# Patient Record
Sex: FEMALE | Race: WHITE | NOT HISPANIC OR LATINO | Employment: PART TIME | URBAN - NONMETROPOLITAN AREA
[De-identification: names, ages, dates, MRNs, and addresses within clinical notes are randomized per-mention and may not be internally consistent; named-entity substitution may affect disease eponyms.]

---
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
## 2022-01-30 DIAGNOSIS — E55.9 VITAMIN D DEFICIENCY: ICD-10-CM

## 2022-01-31 RX ORDER — ERGOCALCIFEROL 1.25 MG/1
CAPSULE ORAL
Qty: 8 CAPSULE | Refills: 0 | OUTPATIENT
Start: 2022-01-31

## 2022-10-18 ENCOUNTER — TELEPHONE (OUTPATIENT)
Dept: FAMILY MEDICINE CLINIC | Facility: CLINIC | Age: 22
End: 2022-10-18

## 2022-10-18 NOTE — TELEPHONE ENCOUNTER
Attempted to reach patient to reschedule 10/11 appt, patient did not answer, vm did not     HUB okay to reschedule

## 2022-12-01 ENCOUNTER — TELEPHONE (OUTPATIENT)
Dept: FAMILY MEDICINE CLINIC | Facility: CLINIC | Age: 22
End: 2022-12-01

## 2022-12-01 NOTE — TELEPHONE ENCOUNTER
Attempted to contact patient to reschedule previous missed appointment on 10/11/2022    HUB okay to reschedule appointment at patients earliest convenience.    Jennifer Montgomery, [employer]an[employer] Rep